# Patient Record
Sex: MALE | Race: BLACK OR AFRICAN AMERICAN | NOT HISPANIC OR LATINO | Employment: OTHER | URBAN - METROPOLITAN AREA
[De-identification: names, ages, dates, MRNs, and addresses within clinical notes are randomized per-mention and may not be internally consistent; named-entity substitution may affect disease eponyms.]

---

## 2017-01-30 ENCOUNTER — TELEPHONE (OUTPATIENT)
Dept: UROLOGY | Facility: CLINIC | Age: 62
End: 2017-01-30

## 2017-01-30 NOTE — TELEPHONE ENCOUNTER
----- Message from Casey Pandya sent at 1/30/2017  3:51 PM CST -----  Contact: self  Pt called to check status of lab results. Pt can be reached @ [phone].

## 2017-01-30 NOTE — TELEPHONE ENCOUNTER
Patient states that he thought that his appointment was sooner and he did his PSA today through [facility]. I told him once we got the results, we would forward them to the provider and if a repeat was needed, we would let him know. Pt verbalized understanding and I mailed an apt reminder out to him.

## 2017-07-10 ENCOUNTER — OFFICE VISIT (OUTPATIENT)
Dept: UROLOGY | Facility: CLINIC | Age: 62
End: 2017-07-10
Payer: MEDICARE

## 2017-07-10 VITALS
SYSTOLIC BLOOD PRESSURE: 128 MMHG | HEIGHT: 66 IN | WEIGHT: 251.56 LBS | RESPIRATION RATE: 14 BRPM | DIASTOLIC BLOOD PRESSURE: 60 MMHG | BODY MASS INDEX: 40.43 KG/M2 | HEART RATE: 60 BPM

## 2017-07-10 DIAGNOSIS — N40.1 BENIGN NON-NODULAR PROSTATIC HYPERPLASIA WITH LOWER URINARY TRACT SYMPTOMS: ICD-10-CM

## 2017-07-10 DIAGNOSIS — N40.1 BENIGN PROSTATIC HYPERPLASIA WITH NOCTURIA: Primary | ICD-10-CM

## 2017-07-10 DIAGNOSIS — N52.9 IMPOTENCE OF ORGANIC ORIGIN: ICD-10-CM

## 2017-07-10 DIAGNOSIS — R35.1 BENIGN PROSTATIC HYPERPLASIA WITH NOCTURIA: Primary | ICD-10-CM

## 2017-07-10 PROCEDURE — 99214 OFFICE O/P EST MOD 30 MIN: CPT | Mod: S$GLB,,, | Performed by: UROLOGY

## 2017-07-10 PROCEDURE — 99999 PR PBB SHADOW E&M-EST. PATIENT-LVL III: CPT | Mod: PBBFAC,,, | Performed by: UROLOGY

## 2017-07-10 RX ORDER — BETHANECHOL CHLORIDE 25 MG/1
25 TABLET ORAL 2 TIMES DAILY
Qty: 180 TABLET | Refills: 3 | Status: SHIPPED | OUTPATIENT
Start: 2017-07-10 | End: 2018-10-12 | Stop reason: SDUPTHER

## 2017-07-10 RX ORDER — TAMSULOSIN HYDROCHLORIDE 0.4 MG/1
0.4 CAPSULE ORAL DAILY
Qty: 90 CAPSULE | Refills: 4 | Status: SHIPPED | OUTPATIENT
Start: 2017-07-10 | End: 2018-07-21 | Stop reason: SDUPTHER

## 2017-07-10 RX ORDER — FINASTERIDE 5 MG/1
5 TABLET, FILM COATED ORAL DAILY
Qty: 90 TABLET | Refills: 3 | Status: SHIPPED | OUTPATIENT
Start: 2017-07-10 | End: 2018-07-30 | Stop reason: SDUPTHER

## 2017-07-10 NOTE — PROGRESS NOTES
"Subjective:       Adalberto Ray Jr. is a 62 y.o. male who is an established pt with Dr. Su was seen for evaluation of BPH.      Last saw RCA 12/15. He has known BPH and taking Flomax since 2013. He is also on Proscar and Urecholine. Nocturia x 2.     He reports his urinary symptoms have now improved with better DM control.     He is also treated for ED with Viagra. He has a remote history of kidney stones in the 1980s. He was treated for E coli UTI 10/15.    Pelvic US (12/15) - PVR 12.5cc, prostate 45g.     PVR (bladder scan) last visit - 0cc    PSA 1.4 (12/14)  PSA 0.8 (3/16)  No known family history of PCa.     He is here for annual check up. He was planned for PSA prior to appt but refused to have this done before. He reports improvement in nocturia.       The following portions of the patient's history were reviewed and updated as appropriate: allergies, current medications, past family history, past medical history, past social history, past surgical history and problem list.    Review of Systems  Constitutional: no fever or chills  ENT: no nasal congestion or sore throat  Respiratory: no cough or shortness of breath  Cardiovascular: no chest pain or palpitations  Gastrointestinal: no nausea or vomiting, tolerating diet  Genitourinary: as per HPI  Hematologic/Lymphatic: no easy bruising or lymphadenopathy  Musculoskeletal: no arthralgias or myalgias  Skin: no rashes or lesions  Neurological: no seizures or tremors  Behavioral/Psych: no auditory or visual hallucinations       Objective:    Vitals:   /60   Pulse 60   Resp 14   Ht 5' 6" (1.676 m)   Wt 114.1 kg (251 lb 8.7 oz)   BMI 40.60 kg/m²     Physical Exam   General: well developed, well nourished in no acute distress  Head: normocephalic, atraumatic  Neck: supple, trachea midline, no obvious enlargement of thyroid  HEENT: EOMI, mucus membranes moist, sclera anicteric, no hearing impairment  Lungs: symmetric expansion, non-labored " breathing  Cardiovascular: regular rate and rhythm, normal pulses  Abdomen: soft, non tender, non distended, no palpable masses, no hepatosplenomegaly, no hernias, bladder nonpalpable. No CVA tenderness.  Musculoskeletal: no peripheral edema, normal ROM in bilateral upper and lower extremities  Lymphatics: no cervical or inguinal lymphadenopathy  Skin: no rashes or lesions  Neuro: alert and oriented x 3, no gross deficits  Psych: normal judgment and insight, normal mood/affect and non-anxious  Genitourinary:   patient declined exam   BRYAN: patient declined exam      Lab Review   Urine analysis today in clinic shows Negative     Lab Results   Component Value Date    WBC 7.1 08/21/2014    HGB 14.6 08/21/2014    HCT 43.4 08/21/2014    MCV 92.8 08/21/2014     08/21/2014     Lab Results   Component Value Date    CREATININE 1.38 (H) 06/05/2017    BUN 12 06/05/2017     No results found for: PSA  No results found for: PSADIAG    Imaging  US reviewed  Reports and images were personally reviewed by me and discussed with the patient today       Assessment/Plan:      1. Benign non-nodular prostatic hyperplasia with lower urinary tract symptoms    - On Flomax, Proscar, Urecholine. Okay to continue these. Urecholine may cause increased frequency.    - PVR acceptable - 0cc    - Better DM control will continue to improve LUTS and reduce UTI risk   - PSA great (0.8) in 3/16. Not done prior to visit.   - PSA now and in 1 year     2. Impotence of organic origin    - Viagra PRN       Follow up in 12 months with PSA

## 2017-07-31 RX ORDER — DOCUSATE SODIUM 100 MG/1
CAPSULE, LIQUID FILLED ORAL
Qty: 180 CAPSULE | Refills: 0 | Status: SHIPPED | OUTPATIENT
Start: 2017-07-31

## 2018-07-21 DIAGNOSIS — N40.1 BENIGN NON-NODULAR PROSTATIC HYPERPLASIA WITH LOWER URINARY TRACT SYMPTOMS: ICD-10-CM

## 2018-07-23 RX ORDER — TAMSULOSIN HYDROCHLORIDE 0.4 MG/1
CAPSULE ORAL
Qty: 90 CAPSULE | Refills: 0 | Status: SHIPPED | OUTPATIENT
Start: 2018-07-23 | End: 2018-07-30 | Stop reason: SDUPTHER

## 2018-07-23 NOTE — TELEPHONE ENCOUNTER
Spoke with patient appointment scheduled, also notified patient he need labs prior to appointment. Patient states he will get labs on Friday.

## 2018-07-30 ENCOUNTER — OFFICE VISIT (OUTPATIENT)
Dept: UROLOGY | Facility: CLINIC | Age: 63
End: 2018-07-30
Payer: MEDICARE

## 2018-07-30 VITALS
RESPIRATION RATE: 16 BRPM | SYSTOLIC BLOOD PRESSURE: 122 MMHG | WEIGHT: 249 LBS | BODY MASS INDEX: 40.02 KG/M2 | HEIGHT: 66 IN | DIASTOLIC BLOOD PRESSURE: 78 MMHG

## 2018-07-30 DIAGNOSIS — N40.1 BENIGN PROSTATIC HYPERPLASIA WITH LOWER URINARY TRACT SYMPTOMS, SYMPTOM DETAILS UNSPECIFIED: Primary | ICD-10-CM

## 2018-07-30 DIAGNOSIS — R35.1 NOCTURIA: ICD-10-CM

## 2018-07-30 PROCEDURE — 3074F SYST BP LT 130 MM HG: CPT | Mod: CPTII,S$GLB,, | Performed by: NURSE PRACTITIONER

## 2018-07-30 PROCEDURE — 99999 PR PBB SHADOW E&M-EST. PATIENT-LVL III: CPT | Mod: PBBFAC,,, | Performed by: NURSE PRACTITIONER

## 2018-07-30 PROCEDURE — 3078F DIAST BP <80 MM HG: CPT | Mod: CPTII,S$GLB,, | Performed by: NURSE PRACTITIONER

## 2018-07-30 PROCEDURE — 99214 OFFICE O/P EST MOD 30 MIN: CPT | Mod: S$GLB,,, | Performed by: NURSE PRACTITIONER

## 2018-07-30 PROCEDURE — 3008F BODY MASS INDEX DOCD: CPT | Mod: CPTII,S$GLB,, | Performed by: NURSE PRACTITIONER

## 2018-07-30 RX ORDER — FINASTERIDE 5 MG/1
5 TABLET, FILM COATED ORAL DAILY
Qty: 90 TABLET | Refills: 3 | Status: SHIPPED | OUTPATIENT
Start: 2018-07-30 | End: 2019-10-03

## 2018-07-30 RX ORDER — TAMSULOSIN HYDROCHLORIDE 0.4 MG/1
CAPSULE ORAL
Qty: 90 CAPSULE | Refills: 3 | Status: SHIPPED | OUTPATIENT
Start: 2018-07-30 | End: 2019-10-03

## 2018-07-30 NOTE — PROGRESS NOTES
Subjective:       Patient ID: Adalberto Ray Jr. is a 63 y.o. male who is an established patient of Dr. Velazquez though new to me was last seen in this office 7/10/17    Chief Complaint:   No chief complaint on file.    Patient has known BPH and taking Flomax since 2013. Also currently taking Proscar.  He has taken Urecholine in the past--unsure if he is currently taking this medication at this time. Nocturia x 2.      He reports his urinary symptoms have improved with better DM control.      He is also treated for ED with Viagra. He has a remote history of kidney stones in the 1980s. He was treated for E coli UTI 10/2015.     Pelvic US (12/15) - PVR 12.5cc, prostate 45g.      PVR (bladder scan) last visit - 0cc     PSA 1.4 (12/2014)  PSA 0.8 (3/2016)  PSA 0.8 (1/2017)  PSA 0.7 (7/2018)    No known family history of PCa.      He is here for annual check up. He is not having any difficulty voiding. Denies dysuria, gross hematuria or flank pain. Overall he feels well.     ACTIVE MEDICAL ISSUES:  Patient Active Problem List   Diagnosis    DJD (degenerative joint disease)    Essential hypertension, benign    Lumbar disc disease    History of colonic polyps    Benign prostatic hyperplasia with lower urinary tract symptoms    Hyperlipidemia    Anxiety    CKD (chronic kidney disease) stage 2, GFR 60-89 ml/min    Nocturia    Impotence of organic origin    Depression    History of vitamin D deficiency    DM (diabetes mellitus) type II controlled with renal manifestation    Morbid obesity with BMI of 40.0-44.9, adult    Bilateral edema of lower extremity       ALLERGIES AND MEDICATIONS: updated and reviewed.  Review of patient's allergies indicates:  No Known Allergies  Current Outpatient Prescriptions   Medication Sig    atorvastatin (LIPITOR) 10 MG tablet Take 1 tablet (10 mg total) by mouth once daily.     mg capsule TAKE ONE CAPSULE BY MOUTH TWICE DAILY    enalapril (VASOTEC) 10 MG tablet Take 1  tablet by mouth once daily.    ergocalciferol (ERGOCALCIFEROL) 50,000 unit Cap Take 1 capsule (50,000 Units total) by mouth every 7 days.    escitalopram oxalate (LEXAPRO) 10 MG tablet Take 1 tablet (10 mg total) by mouth once daily.    metformin (GLUCOPHAGE XR) 500 MG 24 hr tablet Take 500 mg by mouth daily with breakfast.    potassium chloride SA (K-DUR,KLOR-CON) 20 MEQ tablet Take 1 tablet by mouth once daily.    sildenafil (VIAGRA) 100 MG tablet Take 1 tablet (100 mg total) by mouth daily as needed for Erectile Dysfunction.    tamsulosin (FLOMAX) 0.4 mg Cap TAKE 1 CAPSULE(0.4 MG) BY MOUTH EVERY DAY    amlodipine (NORVASC) 5 MG tablet Take 1 tablet (5 mg total) by mouth once daily.    bethanechol (URECHOLINE) 25 MG Tab Take 1 tablet (25 mg total) by mouth 2 (two) times daily.    finasteride (PROSCAR) 5 mg tablet Take 1 tablet (5 mg total) by mouth once daily.    furosemide (LASIX) 20 MG tablet Take 1 tablet (20 mg total) by mouth once daily.    loratadine (CLARITIN) 10 mg tablet Take 1 tablet (10 mg total) by mouth daily as needed for Allergies.    omeprazole (PRILOSEC) 20 MG capsule Take 2 capsules (40 mg total) by mouth every morning.     No current facility-administered medications for this visit.        Review of Systems   Constitutional: Negative for activity change, chills, fatigue, fever and unexpected weight change.   Eyes: Negative for discharge, redness and visual disturbance.   Respiratory: Negative for cough, shortness of breath and wheezing.    Cardiovascular: Negative for chest pain and leg swelling.   Gastrointestinal: Negative for abdominal distention, abdominal pain, constipation, diarrhea, nausea and vomiting.   Genitourinary: Negative for decreased urine volume, difficulty urinating, dysuria, flank pain, frequency, hematuria and urgency.   Musculoskeletal: Negative for arthralgias, joint swelling and myalgias.   Skin: Negative for color change and rash.   Neurological: Negative for  "dizziness and light-headedness.   Psychiatric/Behavioral: Negative for behavioral problems and confusion. The patient is not nervous/anxious.        Objective:      Vitals:    07/30/18 1121   BP: 122/78   Resp: 16   Weight: 112.9 kg (249 lb)   Height: 5' 6" (1.676 m)     Physical Exam   Constitutional: He is oriented to person, place, and time. He appears well-developed.   HENT:   Head: Normocephalic and atraumatic.   Nose: Nose normal.   Eyes: Conjunctivae are normal. Right eye exhibits no discharge. Left eye exhibits no discharge.   Neck: Normal range of motion. Neck supple. No tracheal deviation present. No thyromegaly present.   Cardiovascular: Normal rate and regular rhythm.    Pulmonary/Chest: Effort normal. No respiratory distress. He has no wheezes.   Abdominal: Soft. He exhibits no distension. There is no hepatosplenomegaly. There is no tenderness. There is no CVA tenderness. No hernia.   Genitourinary:   Genitourinary Comments: Patient declined exam/BRAYN   Musculoskeletal: Normal range of motion. He exhibits no edema.   Neurological: He is alert and oriented to person, place, and time.   Skin: Skin is warm and dry. No rash noted. No erythema.     Psychiatric: He has a normal mood and affect. His behavior is normal. Judgment normal.       Urine dipstick no urine (patient voided prior to office visit).      Assessment:       1. Benign prostatic hyperplasia with lower urinary tract symptoms, symptom details unspecified    2. Nocturia          Plan:       1. Benign prostatic hyperplasia with lower urinary tract symptoms, symptom details unspecified  -PSA (7/2018)-- 0.7  -Unsure if patient taking Urecholine. Instructed patient to bring list of medication to next office visit  - tamsulosin (FLOMAX) 0.4 mg Cap; TAKE 1 CAPSULE(0.4 MG) BY MOUTH EVERY DAY  Dispense: 90 capsule; Refill: 3  - finasteride (PROSCAR) 5 mg tablet; Take 1 tablet (5 mg total) by mouth once daily.  Dispense: 90 tablet; Refill: 3  - PROSTATE " SPECIFIC ANTIGEN, DIAGNOSTIC; Future  - PROSTATE SPECIFIC ANTIGEN, DIAGNOSTIC    2. Nocturia  -Limit evening fluids            Follow-up in about 1 year (around 7/30/2019) for Review PSA.

## 2018-10-15 RX ORDER — BETHANECHOL CHLORIDE 25 MG/1
TABLET ORAL
Qty: 180 TABLET | Refills: 0 | Status: SHIPPED | OUTPATIENT
Start: 2018-10-15 | End: 2019-10-03

## 2019-10-03 ENCOUNTER — OFFICE VISIT (OUTPATIENT)
Dept: UROLOGY | Facility: CLINIC | Age: 64
End: 2019-10-03
Payer: MEDICARE

## 2019-10-03 VITALS
HEART RATE: 68 BPM | DIASTOLIC BLOOD PRESSURE: 70 MMHG | WEIGHT: 244.38 LBS | BODY MASS INDEX: 39.28 KG/M2 | SYSTOLIC BLOOD PRESSURE: 128 MMHG | HEIGHT: 66 IN | RESPIRATION RATE: 16 BRPM

## 2019-10-03 DIAGNOSIS — N40.1 BENIGN PROSTATIC HYPERPLASIA WITH LOWER URINARY TRACT SYMPTOMS, SYMPTOM DETAILS UNSPECIFIED: Primary | ICD-10-CM

## 2019-10-03 DIAGNOSIS — R35.1 NOCTURIA: ICD-10-CM

## 2019-10-03 PROCEDURE — 99214 OFFICE O/P EST MOD 30 MIN: CPT | Mod: S$GLB,,, | Performed by: UROLOGY

## 2019-10-03 PROCEDURE — 1101F PT FALLS ASSESS-DOCD LE1/YR: CPT | Mod: CPTII,S$GLB,, | Performed by: UROLOGY

## 2019-10-03 PROCEDURE — 3078F DIAST BP <80 MM HG: CPT | Mod: CPTII,S$GLB,, | Performed by: UROLOGY

## 2019-10-03 PROCEDURE — 3074F SYST BP LT 130 MM HG: CPT | Mod: CPTII,S$GLB,, | Performed by: UROLOGY

## 2019-10-03 PROCEDURE — 1101F PR PT FALLS ASSESS DOC 0-1 FALLS W/OUT INJ PAST YR: ICD-10-PCS | Mod: CPTII,S$GLB,, | Performed by: UROLOGY

## 2019-10-03 PROCEDURE — 3074F PR MOST RECENT SYSTOLIC BLOOD PRESSURE < 130 MM HG: ICD-10-PCS | Mod: CPTII,S$GLB,, | Performed by: UROLOGY

## 2019-10-03 PROCEDURE — 99999 PR PBB SHADOW E&M-EST. PATIENT-LVL III: CPT | Mod: PBBFAC,,, | Performed by: UROLOGY

## 2019-10-03 PROCEDURE — 3078F PR MOST RECENT DIASTOLIC BLOOD PRESSURE < 80 MM HG: ICD-10-PCS | Mod: CPTII,S$GLB,, | Performed by: UROLOGY

## 2019-10-03 PROCEDURE — 99999 PR PBB SHADOW E&M-EST. PATIENT-LVL III: ICD-10-PCS | Mod: PBBFAC,,, | Performed by: UROLOGY

## 2019-10-03 PROCEDURE — 99214 PR OFFICE/OUTPT VISIT, EST, LEVL IV, 30-39 MIN: ICD-10-PCS | Mod: S$GLB,,, | Performed by: UROLOGY

## 2019-10-03 RX ORDER — OMEPRAZOLE 20 MG/1
CAPSULE, DELAYED RELEASE ORAL
Refills: 2 | COMMUNITY
Start: 2019-09-04

## 2019-10-03 RX ORDER — IBUPROFEN 800 MG/1
TABLET ORAL
Refills: 1 | COMMUNITY
Start: 2019-07-02

## 2019-10-03 RX ORDER — ACETAMINOPHEN 500 MG
TABLET ORAL
COMMUNITY
Start: 2019-07-10

## 2019-10-03 NOTE — PROGRESS NOTES
"Subjective:       Adalberto Ray Jr. is a 64 y.o. male who is an established pt with Dr. Su was seen for evaluation of BPH.      Last saw RCA 12/15. He has known BPH and taking Flomax since 2013. He was also on Proscar and Urecholine. Nocturia x 2.     He reports his urinary symptoms have now improved with better DM control.     He is also treated for ED with Viagra. He has a remote history of kidney stones in the 1980s. He was treated for E coli UTI 10/15.    Pelvic US (12/15) - PVR 12.5cc, prostate 45g.     PVR (bladder scan) last visit - 0cc    PSA 1.4 (12/14)  PSA 0.8 (3/16)  PSA 0.8 (1/17)  PSA 0.7 (7/18)  PSA 1.9 (10/19)  No known family history of PCa.     7/10/2017  He is here for annual check up. He was planned for PSA prior to appt but refused to have this done before. He reports improvement in nocturia.     10/3/2019  Saw Ericka WALKER 7/18. Denies problems with urination. [facility]       The following portions of the patient's history were reviewed and updated as appropriate: allergies, current medications, past family history, past medical history, past social history, past surgical history and problem list.    Review of Systems  Constitutional: no fever or chills  ENT: no nasal congestion or sore throat  Respiratory: no cough or shortness of breath  Cardiovascular: no chest pain or palpitations  Gastrointestinal: no nausea or vomiting, tolerating diet  Genitourinary: as per HPI  Hematologic/Lymphatic: no easy bruising or lymphadenopathy  Musculoskeletal: no arthralgias or myalgias  Skin: no rashes or lesions  Neurological: no seizures or tremors  Behavioral/Psych: no auditory or visual hallucinations       Objective:    Vitals:   /70   Pulse 68   Resp 16   Ht 5' 6" (1.676 m)   Wt 110.9 kg (244 lb 6.1 oz)   BMI 39.44 kg/m²     Physical Exam   General: well developed, well nourished in no " acute distress  Head: normocephalic, atraumatic  Neck: supple, trachea midline, no obvious enlargement of thyroid  HEENT: EOMI, mucus membranes moist, sclera anicteric, no hearing impairment  Lungs: symmetric expansion, non-labored breathing  Cardiovascular: regular rate and rhythm, normal pulses  Abdomen: soft, non tender, non distended, no palpable masses, no hepatosplenomegaly, no hernias, bladder nonpalpable. No CVA tenderness.  Musculoskeletal: no peripheral edema, normal ROM in bilateral upper and lower extremities  Lymphatics: no cervical or inguinal lymphadenopathy  Skin: no rashes or lesions  Neuro: alert and oriented x 3, no gross deficits  Psych: normal judgment and insight, normal mood/affect and non-anxious  Genitourinary:   patient declined exam   BRYAN: patient declined exam      Lab Review   Urine analysis today in clinic shows Negative     Lab Results   Component Value Date    WBC 7.1 08/21/2014    HGB 14.6 08/21/2014    HCT 43.4 08/21/2014    MCV 92.8 08/21/2014     08/21/2014     Lab Results   Component Value Date    CREATININE 1.38 (H) 06/05/2017    BUN 12 06/05/2017     No results found for: PSA  No results found for: PSADIAG    Imaging  US reviewed  Reports and images were personally reviewed by me and discussed with the patient today       Assessment/Plan:      1. Benign non-nodular prostatic hyperplasia with lower urinary tract symptoms    - PVR acceptable - 0cc    - Better DM control will continue to improve LUTS and reduce UTI risk   - PSA low 1 year ago, significant rise noted this year. Will recheck 1 year.    - s/p UroLift at  in 2/19     2. Impotence of organic origin    - Viagra PRN     3. Nocturia   - Recommend stopping Urecholine as may increase frequency      Follow up in 12 months with PSA

## 2022-02-08 ENCOUNTER — TELEPHONE (OUTPATIENT)
Dept: GASTROENTEROLOGY | Facility: CLINIC | Age: 67
End: 2022-02-08
Payer: MEDICARE

## 2022-02-08 DIAGNOSIS — Z12.11 COLON CANCER SCREENING: Primary | ICD-10-CM
